# Patient Record
Sex: FEMALE | Employment: UNEMPLOYED | URBAN - NONMETROPOLITAN AREA
[De-identification: names, ages, dates, MRNs, and addresses within clinical notes are randomized per-mention and may not be internally consistent; named-entity substitution may affect disease eponyms.]

---

## 2020-07-22 ENCOUNTER — OFFICE VISIT (OUTPATIENT)
Dept: FAMILY MEDICINE | Facility: OTHER | Age: 60
End: 2020-07-22
Attending: FAMILY MEDICINE
Payer: COMMERCIAL

## 2020-07-22 VITALS
DIASTOLIC BLOOD PRESSURE: 86 MMHG | TEMPERATURE: 98.3 F | WEIGHT: 207 LBS | RESPIRATION RATE: 16 BRPM | OXYGEN SATURATION: 97 % | HEART RATE: 72 BPM | SYSTOLIC BLOOD PRESSURE: 120 MMHG

## 2020-07-22 DIAGNOSIS — E78.00 HYPERCHOLESTEROLEMIA: ICD-10-CM

## 2020-07-22 DIAGNOSIS — R30.0 DYSURIA: Primary | ICD-10-CM

## 2020-07-22 DIAGNOSIS — I10 BENIGN ESSENTIAL HYPERTENSION: ICD-10-CM

## 2020-07-22 DIAGNOSIS — F31.81 BIPOLAR 2 DISORDER (H): ICD-10-CM

## 2020-07-22 DIAGNOSIS — K59.01 SLOW TRANSIT CONSTIPATION: ICD-10-CM

## 2020-07-22 DIAGNOSIS — F41.9 ANXIETY: ICD-10-CM

## 2020-07-22 LAB
ALBUMIN UR-MCNC: NEGATIVE MG/DL
APPEARANCE UR: CLEAR
BILIRUB UR QL STRIP: NEGATIVE
COLOR UR AUTO: YELLOW
GLUCOSE UR STRIP-MCNC: NEGATIVE MG/DL
HGB UR QL STRIP: NEGATIVE
KETONES UR STRIP-MCNC: NEGATIVE MG/DL
LEUKOCYTE ESTERASE UR QL STRIP: NEGATIVE
NITRATE UR QL: NEGATIVE
PH UR STRIP: 7 PH (ref 5–7)
SOURCE: NORMAL
SP GR UR STRIP: 1.01 (ref 1–1.03)
UROBILINOGEN UR STRIP-MCNC: NORMAL MG/DL (ref 0–2)

## 2020-07-22 PROCEDURE — 99203 OFFICE O/P NEW LOW 30 MIN: CPT | Performed by: FAMILY MEDICINE

## 2020-07-22 PROCEDURE — G0463 HOSPITAL OUTPT CLINIC VISIT: HCPCS | Performed by: FAMILY MEDICINE

## 2020-07-22 PROCEDURE — 81003 URINALYSIS AUTO W/O SCOPE: CPT | Mod: ZL | Performed by: FAMILY MEDICINE

## 2020-07-22 RX ORDER — BENAZEPRIL HYDROCHLORIDE 40 MG/1
40 TABLET ORAL DAILY
COMMUNITY
Start: 2020-05-08

## 2020-07-22 RX ORDER — DIVALPROEX SODIUM 500 MG/1
TABLET, EXTENDED RELEASE ORAL
COMMUNITY
Start: 2020-04-21

## 2020-07-22 RX ORDER — ATORVASTATIN CALCIUM 10 MG/1
10 TABLET, FILM COATED ORAL DAILY
COMMUNITY
Start: 2020-06-15

## 2020-07-22 RX ORDER — POLYETHYLENE GLYCOL 3350 17 G/17G
17 POWDER, FOR SOLUTION ORAL DAILY
Qty: 1 BOTTLE | Refills: 3 | Status: SHIPPED | OUTPATIENT
Start: 2020-07-22

## 2020-07-22 RX ORDER — CITALOPRAM HYDROBROMIDE 20 MG/1
TABLET ORAL
COMMUNITY
Start: 2020-04-20

## 2020-07-22 RX ORDER — HYDROCHLOROTHIAZIDE 12.5 MG/1
TABLET ORAL
COMMUNITY

## 2020-07-22 RX ORDER — ALPRAZOLAM 0.5 MG
TABLET ORAL
COMMUNITY

## 2020-07-22 RX ORDER — BUPROPION HYDROCHLORIDE 150 MG/1
TABLET ORAL
COMMUNITY
Start: 2020-04-20 | End: 2021-06-14

## 2020-07-22 SDOH — HEALTH STABILITY: MENTAL HEALTH: HOW OFTEN DO YOU HAVE A DRINK CONTAINING ALCOHOL?: NEVER

## 2020-07-22 ASSESSMENT — PAIN SCALES - GENERAL: PAINLEVEL: MODERATE PAIN (5)

## 2020-07-22 NOTE — NURSING NOTE
Patient presenting with lower abdominal pain and dysuria. Urine analysis initiated per verbal orderthat was read back and verified.   Medication Reconciliation: complete    Mirta Nielsen LPN  7/22/2020 4:33 PM

## 2020-07-23 PROBLEM — F41.9 ANXIETY: Status: ACTIVE | Noted: 2020-07-23

## 2020-07-23 PROBLEM — E78.00 HYPERCHOLESTEROLEMIA: Status: ACTIVE | Noted: 2020-07-23

## 2020-07-23 PROBLEM — F31.81 BIPOLAR 2 DISORDER (H): Status: ACTIVE | Noted: 2020-07-23

## 2020-07-23 PROBLEM — I10 BENIGN ESSENTIAL HYPERTENSION: Status: ACTIVE | Noted: 2020-07-23

## 2020-07-23 NOTE — PROGRESS NOTES
SUBJECTIVE:   Francisca Manuel is a 60 year old female who presents to clinic today for the following health issues: Lower abdominal pain    Patient arrives here with a complaint of lower abdominal pain.  States is been going on for about 1-1/2 weeks.  She has had it in the past.  She reports also that there is been a little constipation.  And painful urination.  She is able to sleep.  Bowel movements seem to make it a little worse temporarily.  She did use laxatives and that did seem to help.  States that she has used Metamucil.  There is no bleeding.  She does have a history of diverticulosis.  Pain is mainly located in the suprapubic area.  Does extend the lateral a little bit.  Her past medical history includes hypertension anxiety bipolar and hypercholesterolemia.  She does not live in the area.  Will be apparent to October.  Patient does report that she does have a uterus and to ovaries.        Patient Active Problem List    Diagnosis Date Noted     Benign essential hypertension 07/23/2020     Priority: Medium     Hypercholesterolemia 07/23/2020     Priority: Medium     Bipolar 2 disorder (H) 07/23/2020     Priority: Medium     Anxiety 07/23/2020     Priority: Medium     History reviewed. No pertinent past medical history.   History reviewed. No pertinent surgical history.  Social History     Social History Narrative     Not on file     Current Outpatient Medications   Medication Sig Dispense Refill     ALPRAZolam (XANAX) 0.5 MG tablet Xanax 0.5 mg tablet   1 tab PO BID prn severe anxiety.Prescribed by psychiatrist, Dr. Garcia       atorvastatin (LIPITOR) 10 MG tablet Take 10 mg by mouth daily       benazepril (LOTENSIN) 40 MG tablet Take 40 mg by mouth daily       buPROPion (WELLBUTRIN XL) 150 MG 24 hr tablet TAKE 1 TABLET (150 MG) BY MOUTH ONCE DAILY IN THE MORNING       citalopram (CELEXA) 20 MG tablet TAKE 1 TABLET (20 MG) BY MOUTH ONCE DAILY IN THE MORNING       divalproex sodium extended-release  (DEPAKOTE ER) 500 MG 24 hr tablet TAKE 2 TABLETS (1 000 MG) BY MOUTH EVERY DAY AT BEDTIME       hydrochlorothiazide (HYDRODIURIL) 12.5 MG tablet hydrochlorothiazide 12.5 mg tablet   1 tablet by mouth q am.       polyethylene glycol (MIRALAX) 17 GM/SCOOP powder Take 17 g by mouth daily 1 Bottle 3     Allergies   Allergen Reactions     Contrast Dye Rash     Gadolinium Rash       Review of Systems     OBJECTIVE:     /86   Pulse 72   Temp 98.3  F (36.8  C) (Temporal)   Resp 16   Wt 93.9 kg (207 lb)   SpO2 97%   Breastfeeding No   There is no height or weight on file to calculate BMI.  Physical Exam  Constitutional:       Appearance: Normal appearance. She is obese.   Cardiovascular:      Rate and Rhythm: Normal rate and regular rhythm.   Pulmonary:      Effort: Pulmonary effort is normal.      Breath sounds: Normal breath sounds.   Abdominal:      General: There is no distension.      Comments: Patient has a little tenderness.  There is no rebound or guarding.  She is able to jump up and down without any right lower quadrant pain.  No masses palpated.   Neurological:      Mental Status: She is alert.         Diagnostic Test Results:  Results for orders placed or performed in visit on 07/22/20   UA reflex to Microscopic and Culture     Status: None    Specimen: Midstream Urine   Result Value Ref Range    Color Urine Yellow     Appearance Urine Clear     Glucose Urine Negative NEG^Negative mg/dL    Bilirubin Urine Negative NEG^Negative    Ketones Urine Negative NEG^Negative mg/dL    Specific Gravity Urine 1.010 1.003 - 1.035    Blood Urine Negative NEG^Negative    pH Urine 7.0 5.0 - 7.0 pH    Protein Albumin Urine Negative NEG^Negative mg/dL    Urobilinogen mg/dL Normal 0.0 - 2.0 mg/dL    Nitrite Urine Negative NEG^Negative    Leukocyte Esterase Urine Negative NEG^Negative    Source Midstream Urine        ASSESSMENT/PLAN:         1. Dysuria  UA unremarkable.  - UA reflex to Microscopic and Culture    2. Slow  transit constipation  Possible constipation.  Will start MiraLAX.  - polyethylene glycol (MIRALAX) 17 GM/SCOOP powder; Take 17 g by mouth daily  Dispense: 1 Bottle; Refill: 3    #3 pelvic pain.  Examination basically unremarkable.  Recommended that if the MiraLAX does not seem to help to call and we will proceed with an ultrasound.      Antonio Jerez MD  [facility]

## 2021-06-14 ENCOUNTER — OFFICE VISIT (OUTPATIENT)
Dept: FAMILY MEDICINE | Facility: OTHER | Age: 61
End: 2021-06-14
Attending: FAMILY MEDICINE
Payer: COMMERCIAL

## 2021-06-14 VITALS
OXYGEN SATURATION: 96 % | HEIGHT: 64 IN | RESPIRATION RATE: 18 BRPM | WEIGHT: 207 LBS | DIASTOLIC BLOOD PRESSURE: 70 MMHG | SYSTOLIC BLOOD PRESSURE: 110 MMHG | BODY MASS INDEX: 35.34 KG/M2 | TEMPERATURE: 97.4 F | HEART RATE: 80 BPM

## 2021-06-14 DIAGNOSIS — M26.609 TEMPOROMANDIBULAR JOINT DISORDER: Primary | ICD-10-CM

## 2021-06-14 DIAGNOSIS — K11.5 SUBMANDIBULAR SIALOLITHIASIS: ICD-10-CM

## 2021-06-14 PROCEDURE — G0463 HOSPITAL OUTPT CLINIC VISIT: HCPCS

## 2021-06-14 PROCEDURE — 99214 OFFICE O/P EST MOD 30 MIN: CPT | Performed by: FAMILY MEDICINE

## 2021-06-14 RX ORDER — BUPROPION HYDROCHLORIDE 300 MG/1
TABLET ORAL
COMMUNITY
Start: 2021-05-27

## 2021-06-14 RX ORDER — QUETIAPINE FUMARATE 50 MG/1
TABLET, FILM COATED ORAL EVERY 24 HOURS
COMMUNITY

## 2021-06-14 RX ORDER — PREDNISONE 20 MG/1
TABLET ORAL
COMMUNITY
End: 2021-06-14

## 2021-06-14 RX ORDER — CHLORHEXIDINE GLUCONATE ORAL RINSE 1.2 MG/ML
SOLUTION DENTAL
COMMUNITY
Start: 2021-05-19

## 2021-06-14 RX ORDER — IBUPROFEN 800 MG/1
800 TABLET, FILM COATED ORAL
COMMUNITY

## 2021-06-14 RX ORDER — CYANOCOBALAMIN 1000 UG/ML
1 INJECTION, SOLUTION INTRAMUSCULAR; SUBCUTANEOUS
COMMUNITY

## 2021-06-14 RX ORDER — FAMOTIDINE 20 MG/1
TABLET, FILM COATED ORAL
COMMUNITY

## 2021-06-14 RX ORDER — BACLOFEN 10 MG/1
TABLET ORAL
COMMUNITY

## 2021-06-14 RX ORDER — TRAMADOL HYDROCHLORIDE 50 MG/1
TABLET ORAL
COMMUNITY

## 2021-06-14 ASSESSMENT — PAIN SCALES - GENERAL: PAINLEVEL: SEVERE PAIN (7)

## 2021-06-14 ASSESSMENT — MIFFLIN-ST. JEOR: SCORE: 1493.95

## 2021-06-14 NOTE — PROGRESS NOTES
"Nursing Notes:   Philomena Frank LPN  6/14/2021  4:07 PM  Signed  Chief Complaint   Patient presents with     Mouth Problem     patient presented to the clinic with left sided face/neck/ear pain that has been going on since her surgery 05/19/2021.     Initial /70 (BP Location: Left arm, Patient Position: Sitting, Cuff Size: Adult Large)   Pulse 80   Temp 97.4  F (36.3  C) (Tympanic)   Resp 18   Ht 1.626 m (5' 4\")   Wt 93.9 kg (207 lb)   SpO2 96%   Breastfeeding No   BMI 35.53 kg/m   Estimated body mass index is 35.53 kg/m  as calculated from the following:    Height as of this encounter: 1.626 m (5' 4\").    Weight as of this encounter: 93.9 kg (207 lb).         Medication Reconciliation: Complete      Philomena Frank LPN        Assessment & Plan       ICD-10-CM    1. Temporomandibular joint disorder  M26.609 PHYSICAL THERAPY REFERRAL   2. 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 She had manipulation of the jaw for surgery. Discussed how this could worsen TMJ, cause difficulties in chewing.  Recommend avoiding gum and chewing a lot. Consider soft foods  Demonstrated massage of the muscles of mastication. Referral for PT on TMJ    For the tongue, it is healing. Surgery area healing. No need for antibiotics or other treatments at this time. Reassured this needs time to feel better.    Follow up as needed     34 minutes spent on the date of the encounter doing chart review, review of outside records, interpretation of tests, patient visit and documentation     Maverick Cabrera MD     [facility]      SUBJECTIVE:  61 year old female presents to [facility] for left face pain.   She had submandibular gland stone removal on 5/19 in [place] due to repeated gland swelling for years. A 2 cm stone was removed.  Afterwards had pain and swelling. Received clindamycin 300 mg TID post-op and prednisone.   Still has left facial pain radiating to the left ear.  Is biting " her tongue a lot.  Is biting her cheek at night.  Is worried about healing from the surgery  Does have a history of TMJ.  We discussed that this could be aggravated from the procedure.   reports that they had to apply substantial pressure to her left jaw area in order to force the stone out.  No fever. Has not required repeated antibiotics for the stones prior to surgery  In [place] until October    REVIEW OF SYSTEMS:    Pertinent items are noted in HPI.    Current Outpatient Medications   Medication Sig Dispense Refill     ALPRAZolam (XANAX) 0.5 MG tablet Xanax 0.5 mg tablet   1 tab PO BID prn severe anxiety.Prescribed by psychiatrist, Dr. Garcia       atorvastatin (LIPITOR) 10 MG tablet Take 10 mg by mouth daily       baclofen (LIORESAL) 10 MG tablet baclofen 10 mg tablet       benazepril (LOTENSIN) 40 MG tablet Take 40 mg by mouth daily       buPROPion (WELLBUTRIN XL) 300 MG 24 hr tablet TAKE 1 TABLET BY MOUTH ONCE DAILY IN THE MORNING       chlorhexidine (PERIDEX) 0.12 % solution chlorhexidine gluconate 0.12 % mouthwash       citalopram (CELEXA) 20 MG tablet TAKE 1 TABLET (20 MG) BY MOUTH ONCE DAILY IN THE MORNING       cyanocobalamin (CYANOCOBALAMIN) 1000 MCG/ML injection 1 mL       divalproex sodium extended-release (DEPAKOTE ER) 500 MG 24 hr tablet TAKE 2 TABLETS (1 000 MG) BY MOUTH EVERY DAY AT BEDTIME       famotidine (PEPCID) 20 MG tablet famotidine 20 mg tablet       hydrochlorothiazide (HYDRODIURIL) 12.5 MG tablet hydrochlorothiazide 12.5 mg tablet   1 tablet by mouth q am.       ibuprofen (ADVIL/MOTRIN) 800 MG tablet Take 800 mg by mouth       polyethylene glycol (MIRALAX) 17 GM/SCOOP powder Take 17 g by mouth daily 1 Bottle 3     predniSONE (DELTASONE) 20 MG tablet prednisone 20 mg tablet   TAKE 1 TABLET BY MOUTH TWICE DAILY FOR 5 DAYS       QUEtiapine (SEROQUEL) 50 MG tablet every 24 hours       traMADol (ULTRAM) 50 MG tablet tramadol 50 mg tablet       Allergies   Allergen Reactions     Contrast  "Dye Rash     Gadolinium Rash       OBJECTIVE:  /70 (BP Location: Left arm, Patient Position: Sitting, Cuff Size: Adult Large)   Pulse 80   Temp 97.4  F (36.3  C) (Tympanic)   Resp 18   Ht 1.626 m (5' 4\")   Wt 93.9 kg (207 lb)   SpO2 96%   Breastfeeding No   BMI 35.53 kg/m      EXAM:  General Appearance: Pleasant, alert, appropriate appearance for age. No acute distress  Head: No erythema. Tender over left TMJ. Tender in left pterygoids.   Ear Exam: Normal TM on right. Potential fullness on left, but no erythema. Normal auditory canals  OroPharynx Exam: Normal pharynx. No redness or buccal lesions. Tongue with healing bites on left side. No sores under the tongue  Neck Exam: Supple, no masses or nodes.  Psychiatric: Normal affect and mentation    "

## 2021-06-14 NOTE — PATIENT INSTRUCTIONS
Try massage and stretches to help TMJ  Mouth is healing  No indication for antibiotics at this time  Referral to physical therapy to help TMJ  Tylenol for pain. Up to 3,000 mg daily  Try Anbesol to help pain

## 2021-06-14 NOTE — NURSING NOTE
"Chief Complaint   Patient presents with     Mouth Problem     patient presented to the clinic with left sided face/neck/ear pain that has been going on since her surgery 05/19/2021.     Initial /70 (BP Location: Left arm, Patient Position: Sitting, Cuff Size: Adult Large)   Pulse 80   Temp 97.4  F (36.3  C) (Tympanic)   Resp 18   Ht 1.626 m (5' 4\")   Wt 93.9 kg (207 lb)   SpO2 96%   Breastfeeding No   BMI 35.53 kg/m   Estimated body mass index is 35.53 kg/m  as calculated from the following:    Height as of this encounter: 1.626 m (5' 4\").    Weight as of this encounter: 93.9 kg (207 lb).         Medication Reconciliation: Complete      Philomena Frank LPN   "

## 2025-04-22 ENCOUNTER — TRANSFERRED RECORDS (OUTPATIENT)
Dept: MULTI SPECIALTY CLINIC | Facility: CLINIC | Age: 65
End: 2025-04-22

## 2025-04-22 LAB — PAP SMEAR - HIM PATIENT REPORTED: NORMAL

## 2025-07-14 ENCOUNTER — HOSPITAL ENCOUNTER (OUTPATIENT)
Dept: GENERAL RADIOLOGY | Facility: OTHER | Age: 65
Discharge: HOME | End: 2025-07-14
Payer: COMMERCIAL

## 2025-07-14 ENCOUNTER — OFFICE VISIT (OUTPATIENT)
Dept: FAMILY MEDICINE | Facility: OTHER | Age: 65
End: 2025-07-14
Payer: COMMERCIAL

## 2025-07-14 VITALS
WEIGHT: 202.8 LBS | OXYGEN SATURATION: 97 % | TEMPERATURE: 98.6 F | RESPIRATION RATE: 17 BRPM | SYSTOLIC BLOOD PRESSURE: 136 MMHG | DIASTOLIC BLOOD PRESSURE: 82 MMHG | HEART RATE: 89 BPM | BODY MASS INDEX: 34.62 KG/M2 | HEIGHT: 64 IN

## 2025-07-14 DIAGNOSIS — R10.30 INTERMITTENT LOWER ABDOMINAL PAIN: ICD-10-CM

## 2025-07-14 DIAGNOSIS — K59.00 CONSTIPATION, UNSPECIFIED CONSTIPATION TYPE: ICD-10-CM

## 2025-07-14 DIAGNOSIS — R30.0 DYSURIA: ICD-10-CM

## 2025-07-14 DIAGNOSIS — K57.32 DIVERTICULITIS OF COLON: Primary | ICD-10-CM

## 2025-07-14 DIAGNOSIS — R39.89 DARK YELLOW-COLORED URINE: ICD-10-CM

## 2025-07-14 LAB
ALBUMIN UR-MCNC: NEGATIVE MG/DL
ANION GAP SERPL CALCULATED.3IONS-SCNC: 11 MMOL/L (ref 7–15)
APPEARANCE UR: CLEAR
BASOPHILS # BLD AUTO: 0 10E3/UL (ref 0–0.2)
BASOPHILS NFR BLD AUTO: 0 %
BILIRUB UR QL STRIP: NEGATIVE
BUN SERPL-MCNC: 9.9 MG/DL (ref 8–23)
CALCIUM SERPL-MCNC: 9.6 MG/DL (ref 8.8–10.4)
CHLORIDE SERPL-SCNC: 100 MMOL/L (ref 98–107)
COLOR UR AUTO: YELLOW
CREAT SERPL-MCNC: 0.62 MG/DL (ref 0.51–0.95)
EGFRCR SERPLBLD CKD-EPI 2021: >90 ML/MIN/1.73M2
EOSINOPHIL # BLD AUTO: 0.3 10E3/UL (ref 0–0.7)
EOSINOPHIL NFR BLD AUTO: 3 %
ERYTHROCYTE [DISTWIDTH] IN BLOOD BY AUTOMATED COUNT: 14.5 % (ref 10–15)
GLUCOSE SERPL-MCNC: 94 MG/DL (ref 70–99)
GLUCOSE UR STRIP-MCNC: NEGATIVE MG/DL
HCO3 SERPL-SCNC: 26 MMOL/L (ref 22–29)
HCT VFR BLD AUTO: 37.7 % (ref 35–47)
HGB BLD-MCNC: 12.1 G/DL (ref 11.7–15.7)
HGB UR QL STRIP: NEGATIVE
IMM GRANULOCYTES # BLD: 0.1 10E3/UL
IMM GRANULOCYTES NFR BLD: 1 %
KETONES UR STRIP-MCNC: NEGATIVE MG/DL
LEUKOCYTE ESTERASE UR QL STRIP: ABNORMAL
LYMPHOCYTES # BLD AUTO: 3.1 10E3/UL (ref 0.8–5.3)
LYMPHOCYTES NFR BLD AUTO: 33 %
MCH RBC QN AUTO: 29.4 PG (ref 26.5–33)
MCHC RBC AUTO-ENTMCNC: 32.1 G/DL (ref 31.5–36.5)
MCV RBC AUTO: 92 FL (ref 78–100)
MONOCYTES # BLD AUTO: 0.9 10E3/UL (ref 0–1.3)
MONOCYTES NFR BLD AUTO: 9 %
MUCOUS THREADS #/AREA URNS LPF: PRESENT /LPF
NEUTROPHILS # BLD AUTO: 5.1 10E3/UL (ref 1.6–8.3)
NEUTROPHILS NFR BLD AUTO: 54 %
NITRATE UR QL: NEGATIVE
NRBC # BLD AUTO: 0 10E3/UL
NRBC BLD AUTO-RTO: 0 /100
PH UR STRIP: 6 [PH] (ref 5–9)
PLATELET # BLD AUTO: 336 10E3/UL (ref 150–450)
POTASSIUM SERPL-SCNC: 4.4 MMOL/L (ref 3.4–5.3)
RBC # BLD AUTO: 4.12 10E6/UL (ref 3.8–5.2)
RBC URINE: 4 /HPF
SODIUM SERPL-SCNC: 137 MMOL/L (ref 135–145)
SP GR UR STRIP: 1.02 (ref 1–1.03)
SQUAMOUS EPITHELIAL: 2 /HPF
UROBILINOGEN UR STRIP-MCNC: NORMAL MG/DL
WBC # BLD AUTO: 9.5 10E3/UL (ref 4–11)
WBC URINE: 3 /HPF

## 2025-07-14 PROCEDURE — 74019 RADEX ABDOMEN 2 VIEWS: CPT

## 2025-07-14 PROCEDURE — 85025 COMPLETE CBC W/AUTO DIFF WBC: CPT | Mod: ZL

## 2025-07-14 PROCEDURE — 74019 RADEX ABDOMEN 2 VIEWS: CPT | Mod: 26 | Performed by: RADIOLOGY

## 2025-07-14 PROCEDURE — G0463 HOSPITAL OUTPT CLINIC VISIT: HCPCS

## 2025-07-14 PROCEDURE — 81003 URINALYSIS AUTO W/O SCOPE: CPT | Mod: ZL

## 2025-07-14 PROCEDURE — 99204 OFFICE O/P NEW MOD 45 MIN: CPT

## 2025-07-14 PROCEDURE — 36415 COLL VENOUS BLD VENIPUNCTURE: CPT | Mod: ZL

## 2025-07-14 PROCEDURE — 80048 BASIC METABOLIC PNL TOTAL CA: CPT | Mod: ZL

## 2025-07-14 RX ORDER — CELECOXIB 100 MG/1
CAPSULE ORAL
COMMUNITY

## 2025-07-14 RX ORDER — ASPIRIN 81 MG/1
81 TABLET ORAL DAILY
COMMUNITY

## 2025-07-14 RX ORDER — MULTIVITAMIN WITH IRON
1 TABLET ORAL DAILY
COMMUNITY

## 2025-07-14 ASSESSMENT — PAIN SCALES - GENERAL: PAINLEVEL_OUTOF10: SEVERE PAIN (7)

## 2025-07-14 NOTE — NURSING NOTE
"Chief Complaint   Patient presents with    Abdominal Pain    Constipation     Patient here for lower abdomen pain x2  days. She also reports spasms that radiated to loser back and being constipated. Has treated with benefiber the past 2 days.     Initial /82   Pulse 89   Temp 98.6  F (37  C) (Tympanic)   Resp 17   Ht 1.613 m (5' 3.5\")   Wt 92 kg (202 lb 12.8 oz)   SpO2 97%   BMI 35.36 kg/m   Estimated body mass index is 35.36 kg/m  as calculated from the following:    Height as of this encounter: 1.613 m (5' 3.5\").    Weight as of this encounter: 92 kg (202 lb 12.8 oz).  Medication Review: complete    The next two questions are to help us understand your food security.  If you are feeling you need any assistance in this area, we have resources available to support you today.          7/14/2025   SDOH- Food Insecurity   Within the past 12 months, did you worry that your food would run out before you got money to buy more? N   Within the past 12 months, did the food you bought just not last and you didn t have money to get more? N         Health Care Directive:  Patient does not have a Health Care Directive: Discussed advance care planning with patient; however, patient declined at this time.    Silvina Penny LPN      "

## 2025-07-14 NOTE — PROGRESS NOTES
ASSESSMENT/PLAN:    I have reviewed the nursing notes.  I have reviewed the findings, diagnosis, plan and need for follow up with the patient.    1. Dark yellow-colored urine  2. Dysuria    - UA with Microscopic reflex to Culture- UA is not indicative of a UTI.  Noted small amount of Leukocytes, mucus present, RBC urine > 4 and squamous epithelials > 2.     3. Constipation, unspecified constipation type    - XR Abdomen 2 Views- There is no free air. Small amount of gas and stool is seen  within the colon to the level of the rectum. There are non dilated gas-filled small bowel loops in the left abdomen. No mass is seen and there are no suspicious calcifications. Mild degenerative changes seen in the spine and there is a slight right convex scoliosis per radiologist.    - Please read the\ attached information on constipation for at home care treatment.    4. Intermittent lower abdominal pain    - CBC and Differential- unremarkable results    - Basic Metabolic Panel- unremarkable results    5. Diverticulitis of colon (Primary)    Plan is to treat patient with suspicion of diverticulitis due to history.  Patient is from [place], unlikely that a CT scan would be approved.     - amoxicillin-clavulanate (AUGMENTIN) 875-125 MG tablet; Take 1 tablet by mouth 2 times daily for 7 days.  Dispense: 14 tablet; Refill: 0    - Please read the attached information on diverticulitis for at home care treatment as discussed in the clinic today.    - May use over-the-counter Tylenol as needed for pain or fever    - Discussed warning signs/symptoms indicative of need to f/u    - Follow up if symptoms persist or worsen or concerns    - I explained my diagnostic considerations and recommendations to the patient, who voiced understanding and agreement with the treatment plan. All questions were answered. We discussed potential side effects of any prescribed or recommended therapies, as well as expectations for response to treatments.    America GUARDADO  JANEL Hanson CNP  7/14/2025  10:37 AM    HPI:    Francisca Manuel is a 65 year old female who presents to [facility] today for concerns of lower abdominal pain.  Patient does have history of diverticulitis.  States that for the past 2 days she has been experiencing low abdominal pain, mostly LLQ, felt feverish last night, chills, spasms in LLQ that sometimes radiates to her back.  Patient also states that she has not had a good bowel movement since Thursday but also has not eaten much due to lack of appetite.  States she has also noted her urine darker colored and is experiencing some slight dysuria.  At home care treatment consists of increase in water intake, Tylenol and rest.  Denies current fever, chills, body aches, shortness of breath, chest pain, cough, congestion, rhinorrhea, sore throat, otalgia, headache, lightheadedness, dizziness, nausea, vomiting, diarrhea, or constipation, urinary urgency, frequency or incontinence, flank pain, abnormal vaginal discharge, concerns for an STI, vaginal itching or foul smelling urine.     History reviewed. No pertinent past medical history.  History reviewed. No pertinent surgical history.  Social History     Tobacco Use    Smoking status: Former    Smokeless tobacco: Never    Tobacco comments:     e-cig   Substance Use Topics    Alcohol use: Never     Current Outpatient Medications   Medication Sig Dispense Refill    ALPRAZolam (XANAX) 0.5 MG tablet Xanax 0.5 mg tablet   1 tab PO BID prn severe anxiety.Prescribed by psychiatrist, Dr. Garcia      aspirin 81 MG EC tablet Take 81 mg by mouth daily.      atorvastatin (LIPITOR) 10 MG tablet Take 10 mg by mouth daily      benazepril (LOTENSIN) 40 MG tablet Take 40 mg by mouth daily      chlorhexidine (PERIDEX) 0.12 % solution chlorhexidine gluconate 0.12 % mouthwash      citalopram (CELEXA) 20 MG tablet TAKE 1 TABLET (20 MG) BY MOUTH ONCE DAILY IN THE MORNING      famotidine (PEPCID) 20 MG tablet famotidine 20 mg tablet       "magnesium 250 MG tablet Take 1 tablet by mouth daily.      baclofen (LIORESAL) 10 MG tablet baclofen 10 mg tablet (Patient not taking: Reported on 7/14/2025)      buPROPion (WELLBUTRIN XL) 300 MG 24 hr tablet TAKE 1 TABLET BY MOUTH ONCE DAILY IN THE MORNING (Patient not taking: Reported on 7/14/2025)      celecoxib (CELEBREX) 100 MG capsule TAKE 1 CAPSULE BY MOUTH TWICE DAILY TAKE WITH OTC PEPCID 20 MG TWICE DAILY      cyanocobalamin (CYANOCOBALAMIN) 1000 MCG/ML injection 1 mL (Patient not taking: Reported on 7/14/2025)      divalproex sodium extended-release (DEPAKOTE ER) 500 MG 24 hr tablet TAKE 2 TABLETS (1 000 MG) BY MOUTH EVERY DAY AT BEDTIME (Patient not taking: Reported on 7/14/2025)      hydrochlorothiazide (HYDRODIURIL) 12.5 MG tablet hydrochlorothiazide 12.5 mg tablet   1 tablet by mouth q am. (Patient not taking: Reported on 7/14/2025)      ibuprofen (ADVIL/MOTRIN) 800 MG tablet Take 800 mg by mouth (Patient not taking: Reported on 7/14/2025)      polyethylene glycol (MIRALAX) 17 GM/SCOOP powder Take 17 g by mouth daily (Patient not taking: Reported on 7/14/2025) 1 Bottle 3    QUEtiapine (SEROQUEL) 50 MG tablet every 24 hours (Patient not taking: Reported on 7/14/2025)      traMADol (ULTRAM) 50 MG tablet tramadol 50 mg tablet (Patient not taking: Reported on 7/14/2025)       Allergies   Allergen Reactions    Contrast Dye Rash    Gadolinium Rash     Past medical history, past surgical history, current medications and allergies reviewed and accurate to the best of my knowledge.      ROS:  Refer to HPI    /82   Pulse 89   Temp 98.6  F (37  C) (Tympanic)   Resp 17   Ht 1.613 m (5' 3.5\")   Wt 92 kg (202 lb 12.8 oz)   SpO2 97%   BMI 35.36 kg/m      EXAM:  General Appearance: Well appearing 65 year old female, appropriate appearance for age. No acute distress   Respiratory: normal chest wall and respirations.  Normal effort.  Clear to auscultation bilaterally, no wheezing, crackles or rhonchi.  No " increased work of breathing.  No cough appreciated.  Cardiac: RRR with no murmurs  Abdomen: soft, nontender, no rigidity, no rebound tenderness or guarding, normal bowel sounds present  :  Mild suprapubic tenderness to palpation.  Present right sided CVA tenderness to palpation.    Musculoskeletal:  Equal movement of bilateral upper extremities.  Equal movement of bilateral lower extremities.  Normal gait.    Neuro: Alert and oriented to person, place, and time.   Psychological: normal affect, alert, oriented, and pleasant.     Labs: Xray:  Results for orders placed or performed in visit on 07/14/25   XR Abdomen 2 Views     Status: None    Narrative    PROCEDURE: XR ABDOMEN 2 VIEWS 7/14/2025 11:43 AM    HISTORY: Intermittent lower abdominal pain; Constipation, unspecified  constipation type    COMPARISONS: None.    TECHNIQUE: Supine and upright views.    FINDINGS: There is no free air. Small amount of gas and stool is seen  within the colon to the level of the rectum. There are nondilated  gas-filled small bowel loops in the left abdomen. No mass is seen and  there are no suspicious calcifications. Mild degenerative changes seen  in the spine and there is a slight right convex scoliosis.         Impression    IMPRESSION: No significant bowel distention.    EVELIN LEONARD MD         SYSTEM ID:  [id number]   Basic Metabolic Panel     Status: Normal   Result Value Ref Range    Sodium 137 135 - 145 mmol/L    Potassium 4.4 3.4 - 5.3 mmol/L    Chloride 100 98 - 107 mmol/L    Carbon Dioxide (CO2) 26 22 - 29 mmol/L    Anion Gap 11 7 - 15 mmol/L    Urea Nitrogen 9.9 8.0 - 23.0 mg/dL    Creatinine 0.62 0.51 - 0.95 mg/dL    GFR Estimate >90 >60 mL/min/1.73m2    Calcium 9.6 8.8 - 10.4 mg/dL    Glucose 94 70 - 99 mg/dL   UA with Microscopic reflex to Culture     Status: Abnormal    Specimen: Urine, Clean Catch   Result Value Ref Range    Color Urine Yellow Colorless, Straw, Light Yellow, Yellow    Appearance Urine Clear Clear     Glucose Urine Negative Negative mg/dL    Bilirubin Urine Negative Negative    Ketones Urine Negative Negative mg/dL    Specific Gravity Urine 1.020 1.000 - 1.030    Blood Urine Negative Negative    pH Urine 6.0 5.0 - 9.0    Protein Albumin Urine Negative Negative mg/dL    Urobilinogen Urine Normal Normal mg/dL    Nitrite Urine Negative Negative    Leukocyte Esterase Urine Small (A) Negative    Mucus Urine Present (A) None Seen /LPF    RBC Urine 4 (H) <=2 /HPF    WBC Urine 3 <=5 /HPF    Squamous Epithelials Urine 2 (H) <=1 /HPF    Narrative    Urine Culture not indicated   CBC with platelets and differential     Status: None   Result Value Ref Range    WBC Count 9.5 4.0 - 11.0 10e3/uL    RBC Count 4.12 3.80 - 5.20 10e6/uL    Hemoglobin 12.1 11.7 - 15.7 g/dL    Hematocrit 37.7 35.0 - 47.0 %    MCV 92 78 - 100 fL    MCH 29.4 26.5 - 33.0 pg    MCHC 32.1 31.5 - 36.5 g/dL    RDW 14.5 10.0 - 15.0 %    Platelet Count 336 150 - 450 10e3/uL    % Neutrophils 54 %    % Lymphocytes 33 %    % Monocytes 9 %    % Eosinophils 3 %    % Basophils 0 %    % Immature Granulocytes 1 %    NRBCs per 100 WBC 0 <1 /100    Absolute Neutrophils 5.1 1.6 - 8.3 10e3/uL    Absolute Lymphocytes 3.1 0.8 - 5.3 10e3/uL    Absolute Monocytes 0.9 0.0 - 1.3 10e3/uL    Absolute Eosinophils 0.3 0.0 - 0.7 10e3/uL    Absolute Basophils 0.0 0.0 - 0.2 10e3/uL    Absolute Immature Granulocytes 0.1 <=0.4 10e3/uL    Absolute NRBCs 0.0 10e3/uL   CBC and Differential     Status: None    Narrative    The following orders were created for panel order CBC and Differential.  Procedure                               Abnormality         Status                     ---------                               -----------         ------                     CBC with platelets and ...[[id number]]                      Final result                 Please view results for these tests on the individual orders.